# Patient Record
Sex: FEMALE | Race: WHITE | NOT HISPANIC OR LATINO | URBAN - METROPOLITAN AREA
[De-identification: names, ages, dates, MRNs, and addresses within clinical notes are randomized per-mention and may not be internally consistent; named-entity substitution may affect disease eponyms.]

---

## 2021-01-04 ENCOUNTER — HOSPITAL ENCOUNTER (OUTPATIENT)
Dept: RADIOLOGY | Facility: HOSPITAL | Age: 26
Discharge: HOME | End: 2021-01-04

## 2021-01-04 DIAGNOSIS — M19.90 ARTHRITIS: ICD-10-CM

## 2024-09-28 ENCOUNTER — HEALTH MAINTENANCE LETTER (OUTPATIENT)
Age: 29
End: 2024-09-28

## 2025-02-22 ENCOUNTER — OFFICE VISIT (OUTPATIENT)
Dept: URGENT CARE | Facility: URGENT CARE | Age: 30
End: 2025-02-22
Payer: COMMERCIAL

## 2025-02-22 VITALS
DIASTOLIC BLOOD PRESSURE: 68 MMHG | HEIGHT: 70 IN | OXYGEN SATURATION: 98 % | HEART RATE: 67 BPM | RESPIRATION RATE: 16 BRPM | BODY MASS INDEX: 22.9 KG/M2 | TEMPERATURE: 98.8 F | SYSTOLIC BLOOD PRESSURE: 110 MMHG | WEIGHT: 160 LBS

## 2025-02-22 DIAGNOSIS — M26.609 TMJ (TEMPOROMANDIBULAR JOINT SYNDROME): Primary | ICD-10-CM

## 2025-02-22 PROCEDURE — 99203 OFFICE O/P NEW LOW 30 MIN: CPT | Performed by: PHYSICIAN ASSISTANT

## 2025-02-22 RX ORDER — GUANFACINE 2 MG/1
2 TABLET ORAL AT BEDTIME
COMMUNITY

## 2025-02-22 NOTE — PATIENT INSTRUCTIONS
Patient was educated on the natural course of condition. Take muscle relaxers as directed. Side effects including drowsiness discussed. Conservative measures discussed including OTC analgesics (Ibuprofen 600 mg every 6 hours x 5 days), ice for first 48 hours then heat packs, and gentle massage. Eat soft foods only and avoid caffeine or gum chewing until pain improves. Be aware of your posture and avoid any teeth grinding/clenching. See TMJ specialist if symptoms worsen or do not improve in 1-2 weeks. Seek emergency care if you develop severe pain or inability to open/close jaw.